# Patient Record
Sex: FEMALE | Employment: OTHER | URBAN - METROPOLITAN AREA
[De-identification: names, ages, dates, MRNs, and addresses within clinical notes are randomized per-mention and may not be internally consistent; named-entity substitution may affect disease eponyms.]

---

## 2019-01-11 LAB — MAMMOGRAM: NORMAL

## 2019-08-27 ENCOUNTER — TRANSFERRED RECORDS (OUTPATIENT)
Dept: HEALTH INFORMATION MANAGEMENT | Facility: CLINIC | Age: 54
End: 2019-08-27

## 2019-09-12 ENCOUNTER — MEDICAL CORRESPONDENCE (OUTPATIENT)
Dept: HEALTH INFORMATION MANAGEMENT | Facility: CLINIC | Age: 54
End: 2019-09-12

## 2019-09-12 ENCOUNTER — TRANSFERRED RECORDS (OUTPATIENT)
Dept: HEALTH INFORMATION MANAGEMENT | Facility: CLINIC | Age: 54
End: 2019-09-12

## 2019-09-13 NOTE — TELEPHONE ENCOUNTER
FUTURE VISIT INFORMATION      FUTURE VISIT INFORMATION:    Date: 10/8/2019    Time: 10:30 AM     Location: [facility]  REFERRAL INFORMATION:    Referring provider:  Dr. Rick Nissen     Referring providers clinic:  [facility]    Reason for visit/diagnosis: Hoarseness, Vocal Cord Paralysis     RECORDS REQUESTED FROM:       Clinic name Comments Records Status Imaging Status   [facility]  9/12/19 Office notes with Dr. Nissen, Referral (received on 9/16/19)  8/28/19 Speech Therapy notes with Deirdre Meza, SLP and Lizzette Adams, REGAN    8/27/19 Videofluoroscopic Swallow Study    Care Everywhere  Archived in PACS on 9/20/19 9/13/19 10:07 am Sent fax request to [facility] ([phone]) to push Video Swallow Study into  PACS - [facility]     9/13/19 Per scheduling staff, recs from [place] are being faxed. Will follow up to see if it will include the referral as well. Medical records were located in Care Everywhere - [facility]     9/16/19 9:29 am Referral has been received via fax; sent to [facility] - [facility]     9/20/19 3:47pm Called [facility] ([phone]) and spoke with Abisai. Abisai provided a fax number ([phone]) to fax request to and once she receives the fax, she will push the Video Swallow Study into  PACS and fax report over to the clinic. Fax request sent at 3:53pm - [facility]     9/20/19 4:13 pm Video Swallow study received and archived into PACS -[facility]

## 2019-09-17 ENCOUNTER — DOCUMENTATION ONLY (OUTPATIENT)
Dept: CARE COORDINATION | Facility: CLINIC | Age: 54
End: 2019-09-17

## 2019-10-08 ENCOUNTER — OFFICE VISIT (OUTPATIENT)
Dept: OTOLARYNGOLOGY | Facility: CLINIC | Age: 54
End: 2019-10-08
Payer: MEDICAID

## 2019-10-08 ENCOUNTER — PRE VISIT (OUTPATIENT)
Dept: OTOLARYNGOLOGY | Facility: CLINIC | Age: 54
End: 2019-10-08

## 2019-10-08 VITALS — HEART RATE: 56 BPM | SYSTOLIC BLOOD PRESSURE: 131 MMHG | DIASTOLIC BLOOD PRESSURE: 75 MMHG

## 2019-10-08 DIAGNOSIS — R49.0 DYSPHONIA: Primary | ICD-10-CM

## 2019-10-08 DIAGNOSIS — J38.01 PARALYSIS OF LEFT VOCAL FOLD: ICD-10-CM

## 2019-10-08 DIAGNOSIS — J38.01 VOCAL FOLD PARALYSIS, LEFT: ICD-10-CM

## 2019-10-08 DIAGNOSIS — R49.0 HOARSENESS: Primary | ICD-10-CM

## 2019-10-08 RX ORDER — DONEPEZIL HYDROCHLORIDE 10 MG/1
TABLET, FILM COATED ORAL
Refills: 1 | COMMUNITY
Start: 2019-09-20

## 2019-10-08 RX ORDER — MEMANTINE HYDROCHLORIDE 10 MG/1
20 TABLET ORAL
COMMUNITY
Start: 2017-09-13

## 2019-10-08 ASSESSMENT — PAIN SCALES - GENERAL: PAINLEVEL: NO PAIN (0)

## 2019-10-08 NOTE — PROGRESS NOTES
[facility]  Evaluation report    Clinician: KIRK Kenny), M.A., CFY-SLP  Seen in conjunction with: Dr. Melendrez  Referring physician:  Dr. Nissen  Patient: Christiana Elder  Date of Visit: 10/8/2019    HISTORY  Chief complaint: Christiana Elder is a 54 year old female presenting today for evaluation of voice concerns.    Onset: Suddenly 3 months ago  Inciting incident: None identified  Course: Stable  Salient history: She has a history significant for pre-mature onset Alzheimer's disease. She was evaluated by Dr. Nissen for hoarseness, which revealed a paralyzed right vocal fold. He referred her to our clinic for further evaluation and recommendation regarding dysphonia.    She was joined today by her  who assists in her care and decisions.    CURRENT SYMPTOMS INCLUDE  VOICE    Poor voice quality    Hoarse voice    Breathy voice    Raspy voice    Limited upper range    Change in speaking pitch; variable    No moments of normal voice    No pain with voicing    Mild to moderate vocal demand    Most talking is conversational speech     Quiet for long periods throughout the day    COUGH/THROAT CLEARING    Denies issues     SWALLOWING    Video Swallow Study was completed on 8/28/2019    Delayed initiation of swallow and premature spillage into pyriform sinuses with all consistencies    No penetration or aspiration    Recommend regular texture with thin liquids    No further swallow treatment     BREATHING    Denies issues    Patient denies significant dyspnea, dysphagia, cough and pain.     OTHER PERTINENT HISTORY    Complex medical history: please also refer to Dr. Melendrez's dictation.     Early onset Alzheimer's disease diagnosed two years ago    OBJECTIVE  PATIENT REPORTED MEASURES    Effort to talk: 4 / 10 (0-10 in which 10 represents maximal effort)    Voice quality: 4 / 10 (0-10 in which 10 represents best possible voice)     Patient Supplied Answers To VHI Questionnaire  Voice Handicap Index  "(VHI-10) 10/8/2019   My voice makes it difficult for people to hear me 3   People have difficulty understanding me in a noisy room 3   My voice difficulties restrict my personal and social life.  3   I feel left out of conversations because of my voice 3   My voice problem causes me to lose income 0   I feel as though I have to strain to produce voice 4   The clarity of my voice is unpredictable 3   My voice problem upsets me 4   My voice makes me feel handicapped 4   People ask, \"What's wrong with your voice?\" 0   VHI-10 27     Patient Supplied Answers To EAT Questionnaire  Eating Assessment Tool (EAT-10) 10/8/2019   My swallowing problem has caused me to lose weight 0   My swallowing problem interferes with my ability to go out for meals 0   Swallowing liquids takes extra effort 0   Swallowing solids takes extra effort 0   Swallowing pills takes extra effort 3   Swallowing is painful 0   The pleasure of eating is affected by my swallowing 0   When I swallow food sticks in my throat 0   I cough when I eat 1   Swallowing is stressful 1   EAT-10 5       PERCEPTUAL EVALUATION (CPT 74709)  POSTURE / TENSION:     No overt tension observed    BREATHING:     shallow    phonation is not coordinated with respiration    LARYNGEAL PALPATION:     tenderness of the thyrohyoid area    VOICE:    Roughness: Moderate consistent    Breathiness: Mild to moderate     Strain: Moderate consistent    Intermittent diplophonia    Loudness    Conversational speech:  Mildly reduced    Projected speech:  Mild to moderately reduced    Pitch:    Conversational speech:  WNL today, but can be variable     Pitch glide: neurologically normal; SLN intact, but upper range demonstrates limitations and strain    Resonance:    Conversational speech:  backward focus of resonance    Singing vs. Speech:  No significant changes    CAPE-V Overall Severity:  44/100    COUGH/THROAT CLEARING:    Not observed    LARYNGEAL EXAMINATION  Procedure: Flexible " endoscopy with chip-tip technology without stroboscopy, right nostril; topical anesthesia with 3% Lidocaine and 0.25% phenylephrine was applied.   Performed by: Dr. Melendrez   The laryngeal and pharyngeal structures were evaluated for gross appearance, mobility, function, and focal lesions / abnormalities of the associated mucosa.    All findings were within normal limits with the exception of the following salient features:     Pooling of secretions in the pyriform sinuses and valleculae    Left vocal fold is immobile in the paramedian position    Left arytenoid cartilage demonstrates subtle inconsistent mobility, which was most present during laughter.    Previously noted right vocal fold demonstrates normal mobility today    The right true vocal fold slightly crosses midline to make incomplete glottic closure with the left immobile vocal fold.    Right false fold medialization with phonation and connected speech; anterior gap present, as well as a possible dampening and level difference (right lower than left) between the vocal folds when attempting adduction due to medialization.    THERAPY PROBES: Minimal improvement was elicited with use of glottic coup to promote vocal fold closure        Left vocal fold is immobile in the paramedian position       Right true vocal fold slightly crosses midline for poor adduction with left.  Right false fold medialization with phonation and connected speech.     The laryngeal exam was reviewed with Ms. Elder, and I provided pertinent explanations, as well as oral information.      ASSESSMENT / PLAN  IMPRESSIONS: Christiana Elder is presenting today with R49.0 (Dysphonia) in the context of J38.01 (Left Unilateral Vocal Fold Paralysis). Dysphonia is accounted for by the immobility of the vocal fold, and resulting poor glottic closure. Laryngeal examination revealed pooling of secretions throughout the laryngeal area and a left vocal fold paralysis. This results in incomplete glottic  closure. The right vocal fold attempts to compensate with right false fold compression during connected speech. Ms. Elder's ability to follow commands throughout the examination is limited secondary to her Alzheimer's disease. Because of this, she is not a good candidate for independent voice therapy. Dr. Melendrez recommended a Cymetra injection to improve glottic closure.  I provided my card, in case therapy is warranted following the treatment, which would require the guidance of her  to accurately and consistently complete any recommended tasks at home.    STIMULABILITY: results of therapy probes during perceptual and laryngeal evaluation demonstrate minimal improvement with use of glottic coup to promote vocal fold closure. Patient demonstrated moderate difficulty following commands during the laryngeal exam.     RECOMMENDATIONS:     Not recommended at this time due to cognitive barriers to learning. Pt demonstrates moderate difficulty following commands.     Cymetra injection in-clinic today with Dr. Melendrez    I provided Ms. Elder with our contact information as well as Dr. Robbie Camp's information at [facility] should she want to pursue voice therapy after the injection closer to home. I informed the patient and her , voice therapy is not medically necessary at this time. The injection completed today will improve glottic closure and thus, improve the quality of her voice. However, should she feel continued difficulties after the injection, voice therapy could help to optimize results. Her ability to participate in voice therapy is limited by cognitive barriers to learning secondary to Alzheimer's disease.     This treatment plan was developed with the patient who agreed with the recommendations.    TOTAL SERVICE TIME: 30 minutes  EVALUATION OF VOICE AND RESONANCE (14274)  NO CHARGE FACILITY FEE (66407)    KIRK Kenny (JC kellogg, CFY-SLP  Speech Language Pathology  Clinical Fellow  SHAHEEN Trained Vocologist   [facility]   [phone]  [email or web address]    Supervision Provided By;  Kelly Ferrari M.M. (voice), M.A., CCC/SLP  Speech-Language Pathologist  NCJESSICA Trained Vocologist  [facility]  [phone]  [email or web address]  Pronouns: she/her

## 2019-10-08 NOTE — NURSING NOTE
Invasive Procedure Safety Checklist  Procedure: Cymetra    Responsible person(s):  Complete sections as appropriate and electronically sign and date below.    Staff/Provider  Consent documentation on chart:  YES  H&P is not applicable (when straight local anesthesia is used).    Procedure Team  Completed by comparing informed consent documentation, information on the patient record and/or the marked surgical site, and discussion with the patient/guardian.     Verified:  (Select all that apply)  Patient identification (two indicators)  Procedure to be performed  Procedure site and /or laterality and/or level  Consent  Procedure site:  Site can not be marked due to location.  Provider Muniz - Site/Laterality/Level:  No Level or Structure  Staff/Provider:  No images    Procedure Team:  *Pause for the Cause* verbal and active participation of team members- verify:  Patient name:  YES  Procedure to be performed:  YES  Site, laterality and level, noting patient position:  YES    Above steps completed as applicable (Electronic Signature, Title, Date):    SHYANN Steve    Note:  Any incidents of wrong patient, wrong procedure, or wrong site are reported using the Occurrence Process already in place.  The occurrence form is required to be completed immediately with this type of event.

## 2019-10-08 NOTE — NURSING NOTE
Chief Complaint   Patient presents with     Consult     hoarseness of voice      Blood pressure 131/75, pulse 56.    Davi Anna LPN

## 2019-10-08 NOTE — PROGRESS NOTES
HISTORY OF PRESENT ILLNESS: Christiana Elder is a 54 year old female seen in consultation from Dr. Nissen with a history of vocal fold paralysis with resulting hoarseness and dysphasia.  She has been diagnosed with premature onset Alzheimer's disease. She is here with her .  They had noticed at the time of Dr. Nissen's visit on 9/12/2019 that she had hoarseness for approximately 3 months.  There is also some mild difficulty swallowing with occasional need to cough.  She will occasionally cough up food.  She notes that her voice has been raspy and somewhat breathy.  She is a non-smoker.  She has no pain in the throat and there has not been any heartburn that she can remember.  No postnasal drainage or rhinorrhea was noted.  There is been no significant weight loss or appetite change.  She had been working in speech therapy and diet recommendations were made for regular diet and thin liquids.  She was seen by Dr. Nissen on 9/12/2019 and a right true vocal fold paralysis was noted piriform left true vocal fold was noted to be compensating.  There was no pooling the piriform sinuses.    A modified swallow was performed on 8/27/2019.  It was noted to have very delayed initiation of the swallow and premature spill into the piriform sinuses with all consistencies.  There was no aspiration or penetration.  A prominent cricopharyngeus with some residual contrast pooling of the cricopharyngeus was noted but no timothy Zenker's diverticulum.  She was referred for further evaluation and recommendations for treatment.  She has a breathy quality to her voice and difficulty projecting her voice.  Her  is here with her today and is signing her consents for procedures.        Medical History      Medical History Date Comments   Alzheimer's Disease (HCC)   per [facility] charting     Medical History Date Comments   History of partial hysterectomy 12/1994     Memory loss       Basal cell carcinoma of nose 12/2017       Surgical  History      Surgery Date Site/Laterality Comments   HYSTERECTOMY 1/1/1994 - 12/31/1994   fibroids, heavy periods     APPENDECTOMY     about age 9     COLONOSCOPY SCREENING 02/14/2017   diverticla         Family History      Medical History Relation Name Comments   Other Brother Woo ? colon polyp   Cancer Father   nose   Other Father   AAA   Cancer-breast Mother       Hypertension Mother   skin ca, hysterectomy   Hypertension Sister Laura     Cancer Sister Maylin skin     Relation Name Status Comments   Brother Woo Alive     Father   Alive     Mother   Alive     Sister Laura Alive     Sister Maylin Alive     Son   Alive     Son   Alive      Social History      Tobacco Use Types Packs/Day Years Used Date   Never Smoker           Smokeless Tobacco: Never Used           Tobacco Cessation: Counseling Given: No     Alcohol Use Drinks/Week oz/Week Comments   Yes     minimal         REVIEW OF SYSTEMS: Ten point review of systems was performed and is negative except for:   [facility] ROS 10/8/2019   Constitutional Weight gain   Ears, Nose, Throat Hoarseness   Cardiopulmonary Cough   Musculoskeletal Back pain   Endocrine Frequent urination        ALLERGIES: Benzoyl peroxide    MEDICATIONS:   Current Outpatient Medications   Medication Sig Dispense Refill     acetaminophen 500 MG CAPS        donepezil (ARICEPT) 10 MG tablet TK 1 AND 1/2 TS PO IN THE MORNING  1     memantine (NAMENDA) 10 MG tablet Take 20 mg by mouth       sertraline (ZOLOFT) 50 MG tablet TK 1/2 T PO D FOR 2 WEEKS. INCREASE TO 1 T PO D  6         PHYSICAL EXAMINATION:  She  is awake, alert and in no apparent distress.  She is mildly quiet but answers questions appropriately.  She does have a weak quality to her voice.   Her tympanic membranes are clear and intact bilaterally. External auditory canals are clear.  Nasal exam shows a mild septal deviation without obstruction.  Examination of the oral cavity shows no suspicious lesions.  There is symmetric movement  of the tongue and soft palate.    The oropharynx is clear.  Her neck is supple without significant adenopathy.  Pulse is regular.  Upper airway is clear.  Cranial nerves II-XII are grossly intact.       PROCEDURE: A flexible laryngoscopy  was performed.  Informed consent was obtained and a time out was performed. 3% lidocaine and 0.25% phenylephrine was sprayed into the nasal cavity and allowed 3 to 5 minutes for effect. The scope was passed through the right sided nostril.  Examination showed the left vocal fold to be immobile in the paramedian position and mildly bowed.  The right vocal fold is fully mobile.  There is only intermittent closure of the glottic gap with phonation.  No nodules, polyps or ulcerations are seen.  There is minimal  inflammation or erythema of the supraglottic or glottic larynx.  With phonation there is moderate contraction of the supraglottic larynx.  The hypopharynx is otherwise clear as is the subglottis.     Respiration         Phonation              IMPRESSION/PLAN: Idiopathic  left vocal fold paralysis with beginning of symptoms in June 2019 and first documentation in September 2019.  She has vocal changes and some difficulty swallowing. Since this is idiopathic, I am recommending a CT scan of the neck with contrast ( should include down to the aortic arch).  Since they are from  [place], [place] they would prefer to have this done nearer their home.  I have asked them to contact their primary care to see if this can be done. We did discuss the possibility of a vocal fold injection with Cymetra.  They are quite interested in proceeding and after discussion with both the patient and her  they decided to proceed with a fiberoptic laryngoscopy and Cymetra injection.  PROCEDURE NOTE: After local preparation with an alcohol swab, 1% lidocaine with 1:100,000 epinephrine was injected into the area of the cricothyroid membrane and the left sidethyroid ala. This was given approximately  five minutes to take effect. During this time, the Cymetra was reconstituted with saline and  prepared for injection. After this process was completed, a fiberoptic scope was then placed through the nostril, visualizing the larynx on a video monitor. A 23-gauge needle was passed through the cricothyroid membrane into the left sidevocal fold. Approximately 0.4 cc of Cymetra was injected into the vocal fold. Good medialization with a strong voice and cough post injection. Following the injection, the needle was removed. The fiberoptic scope showed that there was an adequate airway and a convex character to the injected vocal fold where it had been concave previously. The scope was removed, and the procedure was completed. The patient tolerated the procedure well and left our clinic after a short observation.   The lot number for the Cymetra [id number], the expiration date is 05/2021 .    PLAN: I will have  her follow up in approximately  3 months time. I will review the CT scan results when available.

## 2019-10-08 NOTE — PATIENT INSTRUCTIONS
You were seen in the ENT Clinic today by Dr. Melendrez  If you have any questions or concerns after your appointment, please call   -  ENT Clinic: [phone] scheduling option 1 and nurse advice option 3.  -  Please follow up with Dr. Melendrez in approximately 3 months          Thank you for choosing [facility] and allowing us to be apart of your care team!  Fernanda Marie LINH      Patient here today for a Cymetra/Colagen injection to the vocal cords.  1. Instructed patient not to eat or drink for one hour post injection.   2. Explained that it is normal to have a tight/godfather voice for up to 24 hours, and then the voice will return to normal.   3. When patient notices that the positive effect is wearing off (after 2-3 months), he/she should call and schedule another visit.   4. If voice improves after the Cymetra has worn off, they should schedule a return visit with Dr Melendrez to assess improvement.

## 2019-10-08 NOTE — LETTER
10/8/2019       RE: Christiana Elder  [address] [facility] [address] [place] [id number]     Dear Colleague,    Thank you for referring your patient, Christiana Elder, to the [facility] EAR NOSE AND THROAT at [facility]. Please see a copy of my visit note below.      HISTORY OF PRESENT ILLNESS: Christiana Elder is a 54 year old female seen in consultation from Dr. Nissen with a history of vocal fold paralysis with resulting hoarseness and dysphasia.  She has been diagnosed with premature onset Alzheimer's disease. She is here with her .  They had noticed at the time of Dr. Nissen's visit on 9/12/2019 that she had hoarseness for approximately 3 months.  There is also some mild difficulty swallowing with occasional need to cough.  She will occasionally cough up food.  She notes that her voice has been raspy and somewhat breathy.  She is a non-smoker.  She has no pain in the throat and there has not been any heartburn that she can remember.  No postnasal drainage or rhinorrhea was noted.  There is been no significant weight loss or appetite change.  She had been working in speech therapy and diet recommendations were made for regular diet and thin liquids.  She was seen by Dr. Nissen on 9/12/2019 and a right true vocal fold paralysis was noted piriform left true vocal fold was noted to be compensating.  There was no pooling the piriform sinuses.    A modified swallow was performed on 8/27/2019.  It was noted to have very delayed initiation of the swallow and premature spill into the piriform sinuses with all consistencies.  There was no aspiration or penetration.  A prominent cricopharyngeus with some residual contrast pooling of the cricopharyngeus was noted but no timothy Zenker's diverticulum.  She was referred for further evaluation and recommendations for treatment.  She has a breathy quality to her voice and difficulty projecting her voice.  Her  is here with her today and  is signing her consents for procedures.        Medical History      Medical History Date Comments   Alzheimer's Disease (HCC)   per [facility] charting     Medical History Date Comments   History of partial hysterectomy 12/1994     Memory loss       Basal cell carcinoma of nose 12/2017       Surgical History      Surgery Date Site/Laterality Comments   HYSTERECTOMY 1/1/1994 - 12/31/1994   fibroids, heavy periods     APPENDECTOMY     about age 9     COLONOSCOPY SCREENING 02/14/2017   diverticla         Family History      Medical History Relation Name Comments   Other Brother Woo ? colon polyp   Cancer Father   nose   Other Father   AAA   Cancer-breast Mother       Hypertension Mother   skin ca, hysterectomy   Hypertension Sister Laura     Cancer Sister Maylin skin     Relation Name Status Comments   Brother Woo Alive     Father   Alive     Mother   Alive     Sister Laura Alive     Sister Maylin Alive     Son   Alive     Son   Alive      Social History      Tobacco Use Types Packs/Day Years Used Date   Never Smoker           Smokeless Tobacco: Never Used           Tobacco Cessation: Counseling Given: No     Alcohol Use Drinks/Week oz/Week Comments   Yes     minimal         REVIEW OF SYSTEMS: Ten point review of systems was performed and is negative except for:   [facility] 10/8/[facility]   Constitutional Weight gain   Ears, Nose, Throat Hoarseness   Cardiopulmonary Cough   Musculoskeletal Back pain   Endocrine Frequent urination        ALLERGIES: Benzoyl peroxide    MEDICATIONS:   Current Outpatient Medications   Medication Sig Dispense Refill     acetaminophen 500 MG CAPS        donepezil (ARICEPT) 10 MG tablet TK 1 AND 1/2 TS PO IN THE MORNING  1     memantine (NAMENDA) 10 MG tablet Take 20 mg by mouth       sertraline (ZOLOFT) 50 MG tablet TK 1/2 T PO D FOR 2 WEEKS. INCREASE TO 1 T PO D  6         PHYSICAL EXAMINATION:  She  is awake, alert and in no apparent distress.  She is mildly quiet but answers questions  appropriately.  She does have a weak quality to her voice.   Her tympanic membranes are clear and intact bilaterally. External auditory canals are clear.  Nasal exam shows a mild septal deviation without obstruction.  Examination of the oral cavity shows no suspicious lesions.  There is symmetric movement of the tongue and soft palate.    The oropharynx is clear.  Her neck is supple without significant adenopathy.  Pulse is regular.  Upper airway is clear.  Cranial nerves II-XII are grossly intact.       PROCEDURE: A flexible laryngoscopy  was performed.  Informed consent was obtained and a time out was performed. 3% lidocaine and 0.25% phenylephrine was sprayed into the nasal cavity and allowed 3 to 5 minutes for effect. The scope was passed through the right sided nostril.  Examination showed the left vocal fold to be immobile in the paramedian position and mildly bowed.  The right vocal fold is fully mobile.  There is only intermittent closure of the glottic gap with phonation.  No nodules, polyps or ulcerations are seen.  There is minimal  inflammation or erythema of the supraglottic or glottic larynx.  With phonation there is moderate contraction of the supraglottic larynx.  The hypopharynx is otherwise clear as is the subglottis.     Respiration         Phonation              IMPRESSION/PLAN: Idiopathic  left vocal fold paralysis with beginning of symptoms in June 2019 and first documentation in September 2019.  She has vocal changes and some difficulty swallowing. Since this is idiopathic, I am recommending a CT scan of the neck with contrast ( should include down to the aortic arch).  Since they are from  [place], [place] they would prefer to have this done nearer their home.  I have asked them to contact their primary care to see if this can be done. We did discuss the possibility of a vocal fold injection with Cymetra.  They are quite interested in proceeding and after discussion with both the patient and  her  they decided to proceed with a fiberoptic laryngoscopy and Cymetra injection.  PROCEDURE NOTE: After local preparation with an alcohol swab, 1% lidocaine with 1:100,000 epinephrine was injected into the area of the cricothyroid membrane and the left sidethyroid ala. This was given approximately five minutes to take effect. During this time, the Cymetra was reconstituted with saline and  prepared for injection. After this process was completed, a fiberoptic scope was then placed through the nostril, visualizing the larynx on a video monitor. A 23-gauge needle was passed through the cricothyroid membrane into the left sidevocal fold. Approximately 0.4 cc of Cymetra was injected into the vocal fold. Good medialization with a strong voice and cough post injection. Following the injection, the needle was removed. The fiberoptic scope showed that there was an adequate airway and a convex character to the injected vocal fold where it had been concave previously. The scope was removed, and the procedure was completed. The patient tolerated the procedure well and left our clinic after a short observation.   The lot number for the Cymetra [id number], the expiration date is 05/2021 .    PLAN: I will have  her follow up in approximately  3 months time. I will review the CT scan results when available.        Again, thank you for allowing me to participate in the care of your patient.      Sincerely,    Rafael Melendrez MD

## 2019-10-08 NOTE — LETTER
10/8/2019       RE: Christiana Elder  [address] [facility] [address]  [facility] [address]     Dear Colleague,    Thank you for referring your patient, Christiana Elder, to the [facility] VOICE at [facility]. Please see a copy of my visit note below.    [facility] VOICE CLINIC  Evaluation report    Clinician: KIRK Kenny ([place]), M.A., CFY-SLP  Seen in conjunction with: Dr. Melendrez  Referring physician:  Dr. Nissen  Patient: Christiana Elder  Date of Visit: 10/8/2019    HISTORY  Chief complaint: Christiana Elder is a 54 year old female presenting today for evaluation of voice concerns.    Onset: Suddenly 3 months ago  Inciting incident: None identified  Course: Stable  Salient history: She has a history significant for pre-mature onset Alzheimer's disease. She was evaluated by Dr. Nissen for hoarseness, which revealed a paralyzed right vocal fold. He referred her to our clinic for further evaluation and recommendation regarding dysphonia.    She was joined today by her  who assists in her care and decisions.    CURRENT SYMPTOMS INCLUDE  VOICE    Poor voice quality    Hoarse voice    Breathy voice    Raspy voice    Limited upper range    Change in speaking pitch; variable    No moments of normal voice    No pain with voicing    Mild to moderate vocal demand    Most talking is conversational speech     Quiet for long periods throughout the day    COUGH/THROAT CLEARING    Denies issues     SWALLOWING    Video Swallow Study was completed on 8/28/2019    Delayed initiation of swallow and premature spillage into pyriform sinuses with all consistencies    No penetration or aspiration    Recommend regular texture with thin liquids    No further swallow treatment     BREATHING    Denies issues    Patient denies significant dyspnea, dysphagia, cough and pain.     OTHER PERTINENT HISTORY    Complex medical history: please also refer to Dr. Melendrez's dictation.     Early onset Alzheimer's disease  "diagnosed two years ago    OBJECTIVE  PATIENT REPORTED MEASURES    Effort to talk: 4 / 10 (0-10 in which 10 represents maximal effort)    Voice quality: 4 / 10 (0-10 in which 10 represents best possible voice)     Patient Supplied Answers To VHI Questionnaire  Voice Handicap Index (VHI-10) 10/8/2019   My voice makes it difficult for people to hear me 3   People have difficulty understanding me in a noisy room 3   My voice difficulties restrict my personal and social life.  3   I feel left out of conversations because of my voice 3   My voice problem causes me to lose income 0   I feel as though I have to strain to produce voice 4   The clarity of my voice is unpredictable 3   My voice problem upsets me 4   My voice makes me feel handicapped 4   People ask, \"What's wrong with your voice?\" 0   VHI-10 27     Patient Supplied Answers To EAT Questionnaire  Eating Assessment Tool (EAT-10) 10/8/2019   My swallowing problem has caused me to lose weight 0   My swallowing problem interferes with my ability to go out for meals 0   Swallowing liquids takes extra effort 0   Swallowing solids takes extra effort 0   Swallowing pills takes extra effort 3   Swallowing is painful 0   The pleasure of eating is affected by my swallowing 0   When I swallow food sticks in my throat 0   I cough when I eat 1   Swallowing is stressful 1   EAT-10 5       PERCEPTUAL EVALUATION (CPT 78229)  POSTURE / TENSION:     No overt tension observed    BREATHING:     shallow    phonation is not coordinated with respiration    LARYNGEAL PALPATION:     tenderness of the thyrohyoid area    VOICE:    Roughness: Moderate consistent    Breathiness: Mild to moderate     Strain: Moderate consistent    Intermittent diplophonia    Loudness    Conversational speech:  Mildly reduced    Projected speech:  Mild to moderately reduced    Pitch:    Conversational speech:  WNL today, but can be variable     Pitch glide: neurologically normal; SLN intact, but upper range " demonstrates limitations and strain    Resonance:    Conversational speech:  backward focus of resonance    Singing vs. Speech:  No significant changes    CAPE-V Overall Severity:  44/100    COUGH/THROAT CLEARING:    Not observed    LARYNGEAL EXAMINATION  Procedure: Flexible endoscopy with chip-tip technology without stroboscopy, right nostril; topical anesthesia with 3% Lidocaine and 0.25% phenylephrine was applied.   Performed by: Dr. Melendrez   The laryngeal and pharyngeal structures were evaluated for gross appearance, mobility, function, and focal lesions / abnormalities of the associated mucosa.    All findings were within normal limits with the exception of the following salient features:     Pooling of secretions in the pyriform sinuses and valleculae    Left vocal fold is immobile in the paramedian position    Left arytenoid cartilage demonstrates subtle inconsistent mobility, which was most present during laughter.    Previously noted right vocal fold demonstrates normal mobility today    The right true vocal fold slightly crosses midline to make incomplete glottic closure with the left immobile vocal fold.    Right false fold medialization with phonation and connected speech; anterior gap present, as well as a possible dampening and level difference (right lower than left) between the vocal folds when attempting adduction due to medialization.    THERAPY PROBES: Minimal improvement was elicited with use of glottic coup to promote vocal fold closure        Left vocal fold is immobile in the paramedian position       Right true vocal fold slightly crosses midline for poor adduction with left.  Right false fold medialization with phonation and connected speech.     The laryngeal exam was reviewed with Ms. Elder, and I provided pertinent explanations, as well as oral information.      ASSESSMENT / PLAN  IMPRESSIONS: Christiana Jerrod is presenting today with R49.0 (Dysphonia) in the context of J38.01 (Left  Unilateral Vocal Fold Paralysis). Dysphonia is accounted for by the immobility of the vocal fold, and resulting poor glottic closure. Laryngeal examination revealed pooling of secretions throughout the laryngeal area and a left vocal fold paralysis. This results in incomplete glottic closure. The right vocal fold attempts to compensate with right false fold compression during connected speech. Ms. Elder's ability to follow commands throughout the examination is limited secondary to her Alzheimer's disease. Because of this, she is not a good candidate for independent voice therapy. Dr. Melendrez recommended a Cymetra injection to improve glottic closure.  I provided my card, in case therapy is warranted following the treatment, which would require the guidance of her  to accurately and consistently complete any recommended tasks at home.    STIMULABILITY: results of therapy probes during perceptual and laryngeal evaluation demonstrate minimal improvement with use of glottic coup to promote vocal fold closure. Patient demonstrated moderate difficulty following commands during the laryngeal exam.     RECOMMENDATIONS:     Not recommended at this time due to cognitive barriers to learning. Pt demonstrates moderate difficulty following commands.     Cymetra injection in-clinic today with Dr. Melendrez    I provided Ms. Elder with our contact information as well as Dr. Robbie Camp's information at [facility] should she want to pursue voice therapy after the injection closer to home. I informed the patient and her , voice therapy is not medically necessary at this time. The injection completed today will improve glottic closure and thus, improve the quality of her voice. However, should she feel continued difficulties after the injection, voice therapy could help to optimize results. Her ability to participate in voice therapy is limited by cognitive barriers to learning secondary to Alzheimer's  disease.     This treatment plan was developed with the patient who agreed with the recommendations.    TOTAL SERVICE TIME: 30 minutes  EVALUATION OF VOICE AND RESONANCE (04620)  NO CHARGE FACILITY FEE (97194)    KIRK Kenny (music), M.A., CFY-SLP  Speech Language Pathology Clinical Fellow  [facility] Trained Vocologist   [facility]   [phone]  [email or web address]    Supervision Provided By;  Kelly Ferrari M.M. (voice), M.A., CCC/SLP  Speech-Language Pathologist  [facility] Trained Vocologist  [facility]  [phone]  [email or web address]  Pronouns: she/her                       Again, thank you for allowing me to participate in the care of your patient.      Sincerely,    Kelly Ferrari, SLP

## 2019-10-09 PROBLEM — J38.01 VOCAL FOLD PARALYSIS, LEFT: Status: ACTIVE | Noted: 2019-10-09

## 2019-10-10 ENCOUNTER — TELEPHONE (OUTPATIENT)
Dept: OTOLARYNGOLOGY | Facility: CLINIC | Age: 54
End: 2019-10-10

## 2019-10-10 NOTE — TELEPHONE ENCOUNTER
I received a voice message from Triston, , requesting a call back pertaining to what injection did patient receive.    I informed Triston the injection was called Sravanthi. Triston stated her sister is a nurse and curious of the name.  I also reminded Triston to reach out to Lizzette Adams NP, to complete CT neck with contrast.  I encouraged him to call me with any questions or concerns.

## 2019-10-14 ENCOUNTER — TELEPHONE (OUTPATIENT)
Dept: OTOLARYNGOLOGY | Facility: CLINIC | Age: 54
End: 2019-10-14

## 2019-10-14 DIAGNOSIS — J38.01 VOCAL FOLD PARALYSIS, LEFT: Primary | ICD-10-CM

## 2019-10-14 NOTE — TELEPHONE ENCOUNTER
Health Call Center    Phone Message    May a detailed message be left on voicemail: yes    Reason for Call: Order(s): Other:   Reason for requested: CT Scan of Neck with contrast for vocal cord paralysis  Date needed: ASAP  Provider name: Dr Melendrez    Pt having this done closer to home - [facility] - Please fax Order to Fax # [phone]    Any questions, please call Pt sister Iraida Liang on her Cell # [phone]    Thanks!      Action Taken: Message routed to:  [facility] ([facility] ENT

## 2019-10-14 NOTE — TELEPHONE ENCOUNTER
Received message from ENT pool, placed order CT neck ordered in as requested. Could someone please fax orders as directed below, truly appreciate it!

## 2019-10-28 ENCOUNTER — TRANSFERRED RECORDS (OUTPATIENT)
Dept: HEALTH INFORMATION MANAGEMENT | Facility: CLINIC | Age: 54
End: 2019-10-28

## 2019-11-21 ENCOUNTER — TELEPHONE (OUTPATIENT)
Dept: OTOLARYNGOLOGY | Facility: CLINIC | Age: 54
End: 2019-11-21

## 2019-11-21 NOTE — TELEPHONE ENCOUNTER
Records Requested      Facility  [facility]   Outcome 11/21/19 2:15 PM Faxed request to [facility] for 10/29/19 CT Neck to be pushed to PACs - Manda     * 11/21/19 4:26 PM Received images in PACs and attached to patient - Manda

## 2019-12-10 ENCOUNTER — OFFICE VISIT (OUTPATIENT)
Dept: OTOLARYNGOLOGY | Facility: CLINIC | Age: 54
End: 2019-12-10
Payer: MEDICAID

## 2019-12-10 VITALS — WEIGHT: 178 LBS | BODY MASS INDEX: 32.76 KG/M2 | HEIGHT: 62 IN

## 2019-12-10 DIAGNOSIS — J38.01 VOCAL FOLD PARALYSIS, LEFT: ICD-10-CM

## 2019-12-10 DIAGNOSIS — J38.01 PARALYSIS OF LEFT VOCAL FOLD: Primary | ICD-10-CM

## 2019-12-10 RX ORDER — LIDOCAINE HYDROCHLORIDE AND EPINEPHRINE 10; 10 MG/ML; UG/ML
20 INJECTION, SOLUTION INFILTRATION; PERINEURAL ONCE
Status: COMPLETED | OUTPATIENT
Start: 2019-12-10 | End: 2019-12-10

## 2019-12-10 RX ADMIN — LIDOCAINE HYDROCHLORIDE AND EPINEPHRINE 5 ML: 10; 10 INJECTION, SOLUTION INFILTRATION; PERINEURAL at 12:06

## 2019-12-10 ASSESSMENT — MIFFLIN-ST. JEOR: SCORE: 1365.65

## 2019-12-10 ASSESSMENT — PAIN SCALES - GENERAL: PAINLEVEL: NO PAIN (0)

## 2019-12-10 NOTE — LETTER
12/10/2019       RE: Christiana Elder  [address] [facility] [facility] [address]  [place] [address]     Dear Colleague,    Thank you for referring your patient, Christiana Elder, to the [facility] EAR NOSE AND THROAT at [facility]. Please see a copy of my visit note below.      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  She has been diagnosed with premature onset Alzheimer's disease. She is here with her .  They had noticed at the time of a visit with Dr. Nissen on 9/12/2019 that she had hoarseness for approximately 3 months.  She was seen by me on 10/8/2019 and a left true vocal paralysis was documented.  She underwent a Cymetra injection and she had an improvement in her vocal quality.  Over the last week or so she has had some decreased vocal quality and she is here for evaluation and consideration of a left vocal fold injection.    Last 2 Scores for Patient-Answered VHI Questionnaire  No flowsheet data found.    Last 2 Scores for Patient-Answered CSI Questionnaire  No flowsheet data found.      Last 2 Scores for Patient-Answered EAT Questionnaire  No flowsheet data found.            Medical History       Medical History Date Comments   Alzheimer's Disease (HCC)   per [facility] charting      Medical History Date Comments   History of partial hysterectomy 12/1994     Memory loss       Basal cell carcinoma of nose 12/2017        Surgical History       Surgery Date Site/Laterality Comments   HYSTERECTOMY 1/1/1994 - 12/31/1994   fibroids, heavy periods     APPENDECTOMY     about age 9     COLONOSCOPY SCREENING 02/14/2017   diverticla           Family History       Medical History Relation Name Comments   Other Brother Woo ? colon polyp   Cancer Father   nose   Other Father   AAA   Cancer-breast Mother       Hypertension Mother   skin ca, hysterectomy   Hypertension Sister Laura      Cancer Sister Maylin skin      Relation Name Status Comments   Brother Woo Alive     Father   Alive     Mother   Alive     Sister Laura Alive     Sister Maylin Alive     Son   Alive     Son   Alive        SOCIAL HISTORY:   Social History     Tobacco Use     Smoking status: Never Smoker     Smokeless tobacco: Never Used   Substance Use Topics     Alcohol use: Not on file       REVIEW OF SYSTEMS: Ten point review of systems was performed and is negative except for:   [facility] 10/8/2019   Constitutional Weight gain   Ears, Nose, Throat Hoarseness   Cardiopulmonary Cough   Musculoskeletal Back pain   Endocrine Frequent urination        ALLERGIES: Benzoyl peroxide    MEDICATIONS:   Current Outpatient Medications   Medication Sig Dispense Refill     acetaminophen 500 MG CAPS        donepezil (ARICEPT) 10 MG tablet TK 1 AND 1/2 TS PO IN THE MORNING  1     memantine (NAMENDA) 10 MG tablet Take 20 mg by mouth       sertraline (ZOLOFT) 50 MG tablet TK 1/2 T PO D FOR 2 WEEKS. INCREASE TO 1 T PO D  6     PHYSICAL EXAMINATION:  She  is awake, alert and in no apparent distress.  She is mildly quiet but answers questions appropriately.  She does have a weak quality to her voice.   Her tympanic membranes are clear and intact bilaterally. External auditory canals are clear.  Nasal exam shows a mild septal deviation without obstruction.  Examination of the oral cavity shows no suspicious lesions.  There is symmetric movement of the tongue and soft palate.    The oropharynx is clear.  Her neck is supple without significant adenopathy.  Pulse is regular.  Upper airway is clear.  Cranial nerves II-XII are grossly intact.        PROCEDURE: A flexible laryngoscopy  was performed.  Informed consent was obtained and a time out was performed. 3% lidocaine and 0.25% phenylephrine was sprayed into the nasal cavity and allowed 3 to 5 minutes for effect. The scope was passed through the right sided nostril.  Examination showed the left vocal  fold to be immobile in the paramedian position and mildly bowed.  The right vocal fold is fully mobile.  There is only intermittent closure of the glottic gap with phonation.  No nodules, polyps or ulcerations are seen.  There is minimal  inflammation or erythema of the supraglottic or glottic larynx.  With phonation there is moderate contraction of the supraglottic larynx.  The hypopharynx is otherwise clear as is the subglottis.     Respiration         Phonation        Post injection        After local preparation with an alcohol swab, 1% lidocaine with 1:100,000 epinephrine was injected into the area of the cricothyroid membrane and the left side thyroid ala. This was given approximately five minutes to take effect. During this time, the Cymetra was reconstituted with saline and  prepared for injection. After this process was completed, a fiberoptic scope was then placed through the nostril, visualizing the larynx on a video monitor. A 23-gauge needle was passed through the cricothyroid membrane into the left sidevocal fold. Approximately 0.5 cc of Cymetra was injected into the vocal fold. Good medialization with a strong voice and cough post injection. Following the injection, the needle was removed. The fiberoptic scope showed that there was an adequate airway and a convex character to the injected vocal fold where it had been concave previously. The scope was removed, and the procedure was completed. The patient tolerated the procedure well and left our clinic after a short observation.   The lot number for the Cymetra [id number], the expiration date is 2021-07.    PLAN: I will have  her follow up in approximately  3 months time.      Again, thank you for allowing me to participate in the care of your patient.      Sincerely,    Rafael Melendrez MD

## 2019-12-10 NOTE — NURSING NOTE
Invasive Procedure Safety Checklist  Procedure:  cymetra injection    Responsible person(s):  Complete sections as appropriate and electronically sign and date below.    Staff/Provider  Consent documentation on chart:  YES  H&P is not applicable (when straight local anesthesia is used).    Procedure Team  Completed by comparing informed consent documentation, information on the patient record and/or the marked surgical site, and discussion with the patient/guardian.     Verified:  (Select all that apply)  Patient identification (two indicators)  Procedure to be performed  Procedure site and /or laterality and/or level  Consent  Procedure site:  Site can not be marked due to location.  Provider Muniz - Site/Laterality/Level:  No Level or Structure  Staff/Provider:  No images    Procedure Team:  *Pause for the Cause* verbal and active participation of team members- verify:  Patient name:  YES  Procedure to be performed:  YES  Site, laterality and level, noting patient position:  YES    Above steps completed as applicable (Electronic Signature, Title, Date):    SHYANN Steve    Note:  Any incidents of wrong patient, wrong procedure, or wrong site are reported using the Occurrence Process already in place.  The occurrence form is required to be completed immediately with this type of event.

## 2019-12-10 NOTE — PROGRESS NOTES
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  She has been diagnosed with premature onset Alzheimer's disease. She is here with her .  They had noticed at the time of a visit with Dr. Nissen on 9/12/2019 that she had hoarseness for approximately 3 months.  She was seen by me on 10/8/2019 and a left true vocal paralysis was documented.  She underwent a Cymetra injection and she had an improvement in her vocal quality.  Over the last week or so she has had some decreased vocal quality and she is here for evaluation and consideration of a left vocal fold injection.    Last 2 Scores for Patient-Answered VHI Questionnaire  No flowsheet data found.    Last 2 Scores for Patient-Answered CSI Questionnaire  No flowsheet data found.      Last 2 Scores for Patient-Answered EAT Questionnaire  No flowsheet data found.            Medical History       Medical History Date Comments   Alzheimer's Disease (HCC)   per [facility] charting      Medical History Date Comments   History of partial hysterectomy 12/1994     Memory loss       Basal cell carcinoma of nose 12/2017        Surgical History       Surgery Date Site/Laterality Comments   HYSTERECTOMY 1/1/1994 - 12/31/1994   fibroids, heavy periods     APPENDECTOMY     about age 9     COLONOSCOPY SCREENING 02/14/2017   diverticla           Family History       Medical History Relation Name Comments   Other Brother Woo ? colon polyp   Cancer Father   nose   Other Father   AAA   Cancer-breast Mother       Hypertension Mother   skin ca, hysterectomy   Hypertension Sister Laura     Cancer Sister Maylin skin      Relation Name Status Comments   Brother Woo Alive     Father   Alive     Mother   Alive     Sister Laura Alive     Sister Maylin Alive     Son   Alive     Son   Alive        SOCIAL HISTORY:   Social History     Tobacco Use     Smoking status: Never Smoker     Smokeless  tobacco: Never Used   Substance Use Topics     Alcohol use: Not on file       REVIEW OF SYSTEMS: Ten point review of systems was performed and is negative except for:   [facility] ROS 10/8/2019   Constitutional Weight gain   Ears, Nose, Throat Hoarseness   Cardiopulmonary Cough   Musculoskeletal Back pain   Endocrine Frequent urination        ALLERGIES: Benzoyl peroxide    MEDICATIONS:   Current Outpatient Medications   Medication Sig Dispense Refill     acetaminophen 500 MG CAPS        donepezil (ARICEPT) 10 MG tablet TK 1 AND 1/2 TS PO IN THE MORNING  1     memantine (NAMENDA) 10 MG tablet Take 20 mg by mouth       sertraline (ZOLOFT) 50 MG tablet TK 1/2 T PO D FOR 2 WEEKS. INCREASE TO 1 T PO D  6     PHYSICAL EXAMINATION:  She  is awake, alert and in no apparent distress.  She is mildly quiet but answers questions appropriately.  She does have a weak quality to her voice.   Her tympanic membranes are clear and intact bilaterally. External auditory canals are clear.  Nasal exam shows a mild septal deviation without obstruction.  Examination of the oral cavity shows no suspicious lesions.  There is symmetric movement of the tongue and soft palate.    The oropharynx is clear.  Her neck is supple without significant adenopathy.  Pulse is regular.  Upper airway is clear.  Cranial nerves II-XII are grossly intact.        PROCEDURE: A flexible laryngoscopy  was performed.  Informed consent was obtained and a time out was performed. 3% lidocaine and 0.25% phenylephrine was sprayed into the nasal cavity and allowed 3 to 5 minutes for effect. The scope was passed through the right sided nostril.  Examination showed the left vocal fold to be immobile in the paramedian position and mildly bowed.  The right vocal fold is fully mobile.  There is only intermittent closure of the glottic gap with phonation.  No nodules, polyps or ulcerations are seen.  There is minimal  inflammation or erythema of the supraglottic or glottic larynx.   With phonation there is moderate contraction of the supraglottic larynx.  The hypopharynx is otherwise clear as is the subglottis.     Respiration         Phonation        Post injection        After local preparation with an alcohol swab, 1% lidocaine with 1:100,000 epinephrine was injected into the area of the cricothyroid membrane and the left side thyroid ala. This was given approximately five minutes to take effect. During this time, the Cymetra was reconstituted with saline and  prepared for injection. After this process was completed, a fiberoptic scope was then placed through the nostril, visualizing the larynx on a video monitor. A 23-gauge needle was passed through the cricothyroid membrane into the left sidevocal fold. Approximately 0.5 cc of Cymetra was injected into the vocal fold. Good medialization with a strong voice and cough post injection. Following the injection, the needle was removed. The fiberoptic scope showed that there was an adequate airway and a convex character to the injected vocal fold where it had been concave previously. The scope was removed, and the procedure was completed. The patient tolerated the procedure well and left our clinic after a short observation.   The lot number for the Cymetra [id number], the expiration date is 2021-07.    PLAN: I will have  her follow up in approximately  3 months time.

## 2019-12-10 NOTE — NURSING NOTE
"Chief Complaint   Patient presents with     RECHECK     Cymetra injection     Height 1.575 m (5' 2\"), weight 80.7 kg (178 lb).    Nivia Ricci, EMT  "

## 2019-12-10 NOTE — PATIENT INSTRUCTIONS
Thank you for choosing  [facility]  Please follow up with Dr. Melendrez in approximately 3 months.    [phone] appointment scheduling option 1 and nurse advice option 3.    Patient here today for a Cymetra/Colagen injection to the vocal cords.  1. Instructed patient not to eat or drink for one hour post injection.   2. Explained that it is normal to have a tight/godfather voice for up to 24 hours, and then the voice will return to normal.   3. When patient notices that the positive effect is wearing off (after 2-3 months), he/she should call and schedule another visit.   4. If voice improves after the Cymetra has worn off, they should schedule a return visit with Dr Melendrez to assess improvement.

## 2020-05-11 ENCOUNTER — TELEPHONE (OUTPATIENT)
Dept: OTOLARYNGOLOGY | Facility: CLINIC | Age: 55
End: 2020-05-11

## 2020-05-11 NOTE — TELEPHONE ENCOUNTER
[facility] Call Center    Phone Message    May a detailed message be left on voicemail: yes     Reason for Call: Other:      Dion is calling in to report that everything has been fine so far.  No throat issues.     Dion is wondering if they should schedule another visit or just put this on hold until there are issues?    Please call back to discuss.       Action Taken: Message routed to:  [facility] ([facility] ENT    Travel Screening: Not Applicable

## 2020-05-11 NOTE — TELEPHONE ENCOUNTER
Spoke to pts  triston and advised that if patient was feeling ok, it would not be necessary to come in at this time as an injection would not be performed anyways. Writer did advise to schedule an appt in early July in case of any decline in condition, as this would be 3 months from when they were supposed to come in last,also advised if pt did not feel the appointment was needed, they could certainly cancel. Triston was in agreement to this and pt is scheduled for 7/7/20 at 10:20 am.

## 2020-06-29 ENCOUNTER — TELEPHONE (OUTPATIENT)
Dept: OTOLARYNGOLOGY | Facility: CLINIC | Age: 55
End: 2020-06-29

## 2020-06-29 NOTE — TELEPHONE ENCOUNTER
Attempted to call pt in regards to upcoming appointment. Unable to talk to pt or leave a message as voicemail was full. Will attempt to call again prior to appt.

## (undated) RX ORDER — LIDOCAINE HYDROCHLORIDE AND EPINEPHRINE 10; 10 MG/ML; UG/ML
INJECTION, SOLUTION INFILTRATION; PERINEURAL
Status: DISPENSED
Start: 2019-12-10

## (undated) RX ORDER — LIDOCAINE HYDROCHLORIDE AND EPINEPHRINE 10; 10 MG/ML; UG/ML
INJECTION, SOLUTION INFILTRATION; PERINEURAL
Status: DISPENSED
Start: 2019-10-08